# Patient Record
Sex: FEMALE | Race: WHITE | NOT HISPANIC OR LATINO
[De-identification: names, ages, dates, MRNs, and addresses within clinical notes are randomized per-mention and may not be internally consistent; named-entity substitution may affect disease eponyms.]

---

## 2020-11-24 ENCOUNTER — TRANSCRIPTION ENCOUNTER (OUTPATIENT)
Age: 75
End: 2020-11-24

## 2024-02-02 ENCOUNTER — NON-APPOINTMENT (OUTPATIENT)
Age: 79
End: 2024-02-02

## 2024-03-14 PROBLEM — Z00.00 ENCOUNTER FOR PREVENTIVE HEALTH EXAMINATION: Status: ACTIVE | Noted: 2024-03-14

## 2024-03-18 ENCOUNTER — APPOINTMENT (OUTPATIENT)
Dept: ORTHOPEDIC SURGERY | Facility: CLINIC | Age: 79
End: 2024-03-18
Payer: MEDICARE

## 2024-03-18 DIAGNOSIS — Z78.9 OTHER SPECIFIED HEALTH STATUS: ICD-10-CM

## 2024-03-18 DIAGNOSIS — K52.9 NONINFECTIVE GASTROENTERITIS AND COLITIS, UNSPECIFIED: ICD-10-CM

## 2024-03-18 PROCEDURE — 73562 X-RAY EXAM OF KNEE 3: CPT | Mod: LT

## 2024-03-18 PROCEDURE — 20610 DRAIN/INJ JOINT/BURSA W/O US: CPT | Mod: LT

## 2024-03-18 PROCEDURE — 99204 OFFICE O/P NEW MOD 45 MIN: CPT | Mod: 25

## 2024-03-18 RX ORDER — HYDROCHLOROTHIAZIDE 12.5 MG/1
12.5 TABLET ORAL
Qty: 90 | Refills: 0 | Status: ACTIVE | COMMUNITY
Start: 2023-12-29

## 2024-03-18 RX ORDER — ATORVASTATIN CALCIUM 40 MG/1
40 TABLET, FILM COATED ORAL
Qty: 90 | Refills: 0 | Status: ACTIVE | COMMUNITY
Start: 2023-12-29

## 2024-03-18 RX ORDER — BALSALAZIDE DISODIUM 750 MG/1
750 CAPSULE ORAL
Qty: 810 | Refills: 0 | Status: ACTIVE | COMMUNITY
Start: 2023-12-29

## 2024-03-18 RX ORDER — NYSTATIN 100000 [USP'U]/G
100000 CREAM TOPICAL
Qty: 30 | Refills: 0 | Status: ACTIVE | COMMUNITY
Start: 2024-02-05

## 2024-03-18 RX ORDER — MECLIZINE HYDROCHLORIDE 25 MG/1
25 TABLET ORAL
Qty: 20 | Refills: 0 | Status: ACTIVE | COMMUNITY
Start: 2024-02-03

## 2024-03-18 RX ORDER — LISINOPRIL 40 MG/1
40 TABLET ORAL
Qty: 90 | Refills: 0 | Status: ACTIVE | COMMUNITY
Start: 2023-12-29

## 2024-03-18 RX ORDER — OXYBUTYNIN CHLORIDE 10 MG/1
10 TABLET, EXTENDED RELEASE ORAL
Qty: 90 | Refills: 0 | Status: ACTIVE | COMMUNITY
Start: 2023-12-29

## 2024-03-18 RX ORDER — RALOXIFENE HYDROCHLORIDE 60 MG/1
60 TABLET, FILM COATED ORAL
Qty: 90 | Refills: 0 | Status: ACTIVE | COMMUNITY
Start: 2023-12-29

## 2024-03-18 RX ORDER — CHLORHEXIDINE GLUCONATE, 0.12% ORAL RINSE 1.2 MG/ML
0.12 SOLUTION DENTAL
Qty: 473 | Refills: 0 | Status: ACTIVE | COMMUNITY
Start: 2023-12-29

## 2024-03-18 NOTE — DISCUSSION/SUMMARY
[de-identified] : History, clinical examination and imaging were most consistent with: -right knee moderate osteoarthritis   The diagnosis was explained in detail. The potential non-surgical and surgical treatments were reviewed. The relative risks and benefits of each option were considered relative to the patients age, activity level, medical history, symptom severity and previously attempted treatments.   The patient was advised to consult with their primary medical provider prior to initiation of any new medications to reduce the risk of adverse effects specific to their long-term home medications and medical history. The risk of gastrointestinal irritation and kidney injury specific to long-term NSAID use was discussed.   -Patient will proceed with formal PT. -Cortisone injection performed today for symptom relief. -Over the counter NSAID as needed for pain. -The added clinical utility of an MRI was discussed. The patient deferred further diagnostic testing at this time. -Follow up in 2 months, if symptoms persist consider HA injections.    (MDM)   Problem Complexity -Moderate: chronic illness with exacerbation   Risk -Moderate: injection   -Patient has not been seen by another provider in my practice within the past 2 years who specializes in orthopedic surgery.   (Procedure: Corticosteroid Injection)   Injection location was the: -left knee joint   Injection contents were: 40 mg of kenalog and 5 mL of 1% lidocaine   Procedure Details: -Informed consent was obtained. Discussed possible risks of corticosteroid injection including hyperglycemia, infection and skin discoloration. -Discussed that due to infection risk, an interval between injection and any future surgery is 6 weeks for arthroscopy and 3 months for arthroplasty. -Injection performed using aseptic technique. Hemostasis was confirmed. -Procedure was tolerated well with no complications.

## 2024-03-18 NOTE — IMAGING
[Right] : right knee [All Views] : anteroposterior, lateral, skyline, and anteroposterior standing [Moderate tricompartmental OA lateral narrowing] : Moderate tricompartmental OA lateral narrowing [de-identified] : (Exam: Knee)   Laterality is left   Patient is in no acute distress, alert and oriented Sensation is grossly intact to light touch in the foot Motor function is 5/5 in the foot Capillary refill is less than 2 seconds in the toes Lymphadenopathy is not present Peripheral edema is not present   Skin is intact Effusion is trace Atrophy is not present Antalgic gait is present   Medial joint line tenderness is not present Lateral joint line tenderness is present Patellar tenderness is present Calf tenderness is not present   Knee extension is 0 Knee flexion is 125   Quadriceps strength is 5/5 Hamstring strength is 5/5   Lachman is normal Posterior drawer is normal Varus stress stability is normal Valgus stress stability is normal   Medial Shyla test is normal Lateral Shyla test is abnormal Patellofemoral grind test is abnormal Patellar apprehension test is normal

## 2024-03-18 NOTE — HISTORY OF PRESENT ILLNESS
[de-identified] : Date of initial evaluation is 03/18/2024 Patient age is 78 year  Occupation is retired Body part causing symptoms is the left knee Symptoms began NKI 2 weeks ago Location of pain is lateral Quality of pain is dull Pain score at rest is 5/10 Pain score during activity is 6/10 Radicular symptoms are not present Prior treatments include none Patient's condition is not associated with workers compensation, no-fault or interscholastic athletics

## 2024-05-20 ENCOUNTER — APPOINTMENT (OUTPATIENT)
Dept: ORTHOPEDIC SURGERY | Facility: CLINIC | Age: 79
End: 2024-05-20
Payer: MEDICARE

## 2024-05-20 PROCEDURE — 20610 DRAIN/INJ JOINT/BURSA W/O US: CPT | Mod: RT

## 2024-05-20 PROCEDURE — 99214 OFFICE O/P EST MOD 30 MIN: CPT | Mod: 25

## 2024-05-20 RX ORDER — AZITHROMYCIN 250 MG/1
250 TABLET, FILM COATED ORAL
Qty: 6 | Refills: 0 | Status: ACTIVE | COMMUNITY
Start: 2024-04-24

## 2024-05-20 RX ORDER — OFLOXACIN 3 MG/ML
0.3 SOLUTION/ DROPS OPHTHALMIC
Qty: 5 | Refills: 0 | Status: ACTIVE | COMMUNITY
Start: 2024-04-24

## 2024-05-20 NOTE — HISTORY OF PRESENT ILLNESS
[de-identified] : 05/20/2024: f/u for left knee. CSI on 03/18/2024 with partial relief. HEP 5x a week. reports pain with stairs.   Date of initial evaluation is 03/18/2024 Patient age is 78 year  Occupation is retired Body part causing symptoms is the left knee Symptoms began NKI 2 weeks ago Location of pain is lateral Quality of pain is dull Pain score at rest is 5/10 Pain score during activity is 6/10 Radicular symptoms are not present Prior treatments include none Patient's condition is not associated with workers compensation, no-fault or interscholastic athletics

## 2024-05-20 NOTE — DISCUSSION/SUMMARY
[de-identified] : History, clinical examination and imaging were most consistent with: -right knee moderate osteoarthritis   The diagnosis was explained in detail. The potential non-surgical and surgical treatments were reviewed. The relative risks and benefits of each option were considered relative to the patients age, activity level, medical history, symptom severity and previously attempted treatments.   The patient was advised to consult with their primary medical provider prior to initiation of any new medications to reduce the risk of adverse effects specific to their long-term home medications and medical history. The risk of gastrointestinal irritation and kidney injury specific to long-term NSAID use was discussed.   -Hyaluronic acid injection will be performed for symptom relief. The patient has severe degenerative joint disease that has not improved with multiple non-surgical modalities including cortisone injection, rest and oral pain medication. -Continue HEP. -Over the counter NSAID as needed for pain. -Follow up in 1 week for next injection.    (MDM)   Problem Complexity -Moderate: chronic illness with exacerbation   Risk -Moderate: injection   (Procedure: Hyaluronic Acid Injection)   Injection location was the: -left knee joint   Injection contents were: -Gelsyn-3   Procedure Details: -Informed consent was obtained. Discussed possible risks of hyaluronic acid injection including infection and local inflammatory reaction. -Discussed that due to infection risk, an interval between injection and any future surgery is 6 weeks for arthroscopy and 3 months for arthroplasty. -Injection performed using aseptic technique. Hemostasis was confirmed. -Procedure was tolerated well with no complications.

## 2024-05-20 NOTE — IMAGING
[Right] : right knee [All Views] : anteroposterior, lateral, skyline, and anteroposterior standing [Moderate tricompartmental OA lateral narrowing] : Moderate tricompartmental OA lateral narrowing [de-identified] : (Exam: Knee)   Laterality is left   Patient is in no acute distress, alert and oriented Sensation is grossly intact to light touch in the foot Motor function is 5/5 in the foot Capillary refill is less than 2 seconds in the toes Lymphadenopathy is not present Peripheral edema is not present   Skin is intact Effusion is trace Atrophy is not present Antalgic gait is present   Medial joint line tenderness is not present Lateral joint line tenderness is present Patellar tenderness is present Calf tenderness is not present   Knee extension is 0 Knee flexion is 125   Quadriceps strength is 5/5 Hamstring strength is 5/5   Lachman is normal Posterior drawer is normal Varus stress stability is normal Valgus stress stability is normal   Medial Shyla test is normal Lateral Shyla test is abnormal Patellofemoral grind test is abnormal Patellar apprehension test is normal

## 2024-06-03 ENCOUNTER — APPOINTMENT (OUTPATIENT)
Dept: ORTHOPEDIC SURGERY | Facility: CLINIC | Age: 79
End: 2024-06-03
Payer: MEDICARE

## 2024-06-03 PROCEDURE — 20610 DRAIN/INJ JOINT/BURSA W/O US: CPT | Mod: LT

## 2024-06-03 RX ORDER — OXYBUTYNIN CHLORIDE 15 MG/1
15 TABLET, EXTENDED RELEASE ORAL
Qty: 90 | Refills: 0 | Status: ACTIVE | COMMUNITY
Start: 2024-05-30

## 2024-06-03 NOTE — IMAGING
[Right] : right knee [All Views] : anteroposterior, lateral, skyline, and anteroposterior standing [Moderate tricompartmental OA lateral narrowing] : Moderate tricompartmental OA lateral narrowing [de-identified] : (Exam: Knee)   Laterality is left   Patient is in no acute distress, alert and oriented Sensation is grossly intact to light touch in the foot Motor function is 5/5 in the foot Capillary refill is less than 2 seconds in the toes Lymphadenopathy is not present Peripheral edema is not present   Skin is intact Effusion is trace Atrophy is not present Antalgic gait is present   Medial joint line tenderness is not present Lateral joint line tenderness is present Patellar tenderness is present Calf tenderness is not present   Knee extension is 0 Knee flexion is 125   Quadriceps strength is 5/5 Hamstring strength is 5/5   Lachman is normal Posterior drawer is normal Varus stress stability is normal Valgus stress stability is normal   Medial Shyla test is normal Lateral Shyla test is abnormal Patellofemoral grind test is abnormal Patellar apprehension test is normal

## 2024-06-03 NOTE — HISTORY OF PRESENT ILLNESS
[de-identified] : 06/03/24: presents today for Gelsyn injection #2. Gelysyn #1 was given on 05/20/24 with improvement.   05/20/2024: f/u for left knee. CSI on 03/18/2024 with partial relief. HEP 5x a week. reports pain with stairs.   Date of initial evaluation is 03/18/2024 Patient age is 78 year  Occupation is retired Body part causing symptoms is the left knee Symptoms began NKI 2 weeks ago Location of pain is lateral Quality of pain is dull Pain score at rest is 5/10 Pain score during activity is 6/10 Radicular symptoms are not present Prior treatments include none Patient's condition is not associated with workers compensation, no-fault or interscholastic athletics

## 2024-06-03 NOTE — DISCUSSION/SUMMARY
[de-identified] : History, clinical examination and imaging were most consistent with: -right knee moderate osteoarthritis   The diagnosis was explained in detail. The potential non-surgical and surgical treatments were reviewed. The relative risks and benefits of each option were considered relative to the patients age, activity level, medical history, symptom severity and previously attempted treatments.   The patient was advised to consult with their primary medical provider prior to initiation of any new medications to reduce the risk of adverse effects specific to their long-term home medications and medical history. The risk of gastrointestinal irritation and kidney injury specific to long-term NSAID use was discussed.   -Hyaluronic acid injection will be performed for symptom relief. The patient has severe degenerative joint disease that has not improved with multiple non-surgical modalities including cortisone injection, rest and oral pain medication. -Continue HEP. -Over the counter NSAID as needed for pain. -Follow up in 1 week for final injection.    (MDM)   Problem Complexity -Moderate: chronic illness with exacerbation   Risk -Moderate: injection   (Procedure: Hyaluronic Acid Injection)   Injection location was the: -left knee joint   Injection contents were: -Gelsyn-3   Procedure Details: -Informed consent was obtained. Discussed possible risks of hyaluronic acid injection including infection and local inflammatory reaction. -Discussed that due to infection risk, an interval between injection and any future surgery is 6 weeks for arthroscopy and 3 months for arthroplasty. -Injection performed using aseptic technique. Hemostasis was confirmed. -Procedure was tolerated well with no complications.

## 2024-06-10 ENCOUNTER — APPOINTMENT (OUTPATIENT)
Dept: ORTHOPEDIC SURGERY | Facility: CLINIC | Age: 79
End: 2024-06-10

## 2024-06-17 ENCOUNTER — APPOINTMENT (OUTPATIENT)
Dept: ORTHOPEDIC SURGERY | Facility: CLINIC | Age: 79
End: 2024-06-17
Payer: MEDICARE

## 2024-06-17 DIAGNOSIS — M17.12 UNILATERAL PRIMARY OSTEOARTHRITIS, LEFT KNEE: ICD-10-CM

## 2024-06-17 PROCEDURE — 20610 DRAIN/INJ JOINT/BURSA W/O US: CPT | Mod: RT

## 2024-06-17 NOTE — IMAGING
[Right] : right knee [All Views] : anteroposterior, lateral, skyline, and anteroposterior standing [Moderate tricompartmental OA lateral narrowing] : Moderate tricompartmental OA lateral narrowing [de-identified] : (Exam: Knee)   Laterality is left   Patient is in no acute distress, alert and oriented Sensation is grossly intact to light touch in the foot Motor function is 5/5 in the foot Capillary refill is less than 2 seconds in the toes Lymphadenopathy is not present Peripheral edema is not present   Skin is intact Effusion is trace Atrophy is not present Antalgic gait is present   Medial joint line tenderness is not present Lateral joint line tenderness is present Patellar tenderness is present Calf tenderness is not present   Knee extension is 0 Knee flexion is 125   Quadriceps strength is 5/5 Hamstring strength is 5/5   Lachman is normal Posterior drawer is normal Varus stress stability is normal Valgus stress stability is normal   Medial Shyla test is normal Lateral Shyla test is abnormal Patellofemoral grind test is abnormal Patellar apprehension test is normal

## 2024-06-17 NOTE — HISTORY OF PRESENT ILLNESS
[de-identified] : 06/17/2024: f/u for left knee, Gelsyn #3 with mild relief so far.   06/03/24: presents today for Gelsyn injection #2. Gelysyn #1 was given on 05/20/24 with improvement.   05/20/2024: f/u for left knee. CSI on 03/18/2024 with partial relief. HEP 5x a week. reports pain with stairs.   Date of initial evaluation is 03/18/2024 Patient age is 78 year  Occupation is retired Body part causing symptoms is the left knee Symptoms began NKI 2 weeks ago Location of pain is lateral Quality of pain is dull Pain score at rest is 5/10 Pain score during activity is 6/10 Radicular symptoms are not present Prior treatments include none Patient's condition is not associated with workers compensation, no-fault or interscholastic athletics

## 2024-06-17 NOTE — DISCUSSION/SUMMARY
[de-identified] : History, clinical examination and imaging were most consistent with: -right knee moderate osteoarthritis   The diagnosis was explained in detail. The potential non-surgical and surgical treatments were reviewed. The relative risks and benefits of each option were considered relative to the patients age, activity level, medical history, symptom severity and previously attempted treatments.   The patient was advised to consult with their primary medical provider prior to initiation of any new medications to reduce the risk of adverse effects specific to their long-term home medications and medical history. The risk of gastrointestinal irritation and kidney injury specific to long-term NSAID use was discussed.   -Hyaluronic acid injection will be performed for symptom relief. The patient has severe degenerative joint disease that has not improved with multiple non-surgical modalities including cortisone injection, rest and oral pain medication. -Continue HEP. -Over the counter NSAID as needed for pain. -Follow up as needed.    (MDM)   Problem Complexity -Moderate: chronic illness with exacerbation   Risk -Moderate: injection   (Procedure: Hyaluronic Acid Injection)   Injection location was the: -left knee joint   Injection contents were: -Gelsyn-3   Procedure Details: -Informed consent was obtained. Discussed possible risks of hyaluronic acid injection including infection and local inflammatory reaction. -Discussed that due to infection risk, an interval between injection and any future surgery is 6 weeks for arthroscopy and 3 months for arthroplasty. -Injection performed using aseptic technique. Hemostasis was confirmed. -Procedure was tolerated well with no complications.

## 2024-11-18 ENCOUNTER — APPOINTMENT (OUTPATIENT)
Dept: ORTHOPEDIC SURGERY | Facility: CLINIC | Age: 79
End: 2024-11-18
Payer: MEDICARE

## 2024-11-18 DIAGNOSIS — M17.12 UNILATERAL PRIMARY OSTEOARTHRITIS, LEFT KNEE: ICD-10-CM

## 2024-11-18 PROCEDURE — 20610 DRAIN/INJ JOINT/BURSA W/O US: CPT | Mod: LT

## 2024-11-18 PROCEDURE — 99214 OFFICE O/P EST MOD 30 MIN: CPT | Mod: 25

## 2024-12-18 ENCOUNTER — APPOINTMENT (OUTPATIENT)
Dept: ORTHOPEDIC SURGERY | Facility: CLINIC | Age: 79
End: 2024-12-18
Payer: MEDICARE

## 2024-12-18 VITALS — HEIGHT: 61 IN | BODY MASS INDEX: 23.03 KG/M2 | WEIGHT: 122 LBS

## 2024-12-18 DIAGNOSIS — M54.50 LOW BACK PAIN, UNSPECIFIED: ICD-10-CM

## 2024-12-18 PROCEDURE — 73503 X-RAY EXAM HIP UNI 4/> VIEWS: CPT | Mod: RT

## 2024-12-18 PROCEDURE — 99214 OFFICE O/P EST MOD 30 MIN: CPT

## 2024-12-18 PROCEDURE — 72100 X-RAY EXAM L-S SPINE 2/3 VWS: CPT

## 2024-12-19 RX ORDER — MELOXICAM 15 MG/1
15 TABLET ORAL DAILY
Qty: 30 | Refills: 1 | Status: ACTIVE | COMMUNITY
Start: 2024-12-19 | End: 1900-01-01

## 2025-01-29 ENCOUNTER — APPOINTMENT (OUTPATIENT)
Dept: ORTHOPEDIC SURGERY | Facility: CLINIC | Age: 80
End: 2025-01-29

## 2025-08-25 ENCOUNTER — APPOINTMENT (OUTPATIENT)
Dept: ORTHOPEDIC SURGERY | Facility: CLINIC | Age: 80
End: 2025-08-25
Payer: MEDICARE

## 2025-08-25 DIAGNOSIS — M17.12 UNILATERAL PRIMARY OSTEOARTHRITIS, LEFT KNEE: ICD-10-CM

## 2025-08-25 PROCEDURE — 73562 X-RAY EXAM OF KNEE 3: CPT | Mod: LT

## 2025-08-25 PROCEDURE — 20610 DRAIN/INJ JOINT/BURSA W/O US: CPT | Mod: LT

## 2025-08-25 PROCEDURE — 99214 OFFICE O/P EST MOD 30 MIN: CPT | Mod: 25
